# Patient Record
Sex: MALE | Race: WHITE | NOT HISPANIC OR LATINO
[De-identification: names, ages, dates, MRNs, and addresses within clinical notes are randomized per-mention and may not be internally consistent; named-entity substitution may affect disease eponyms.]

---

## 2020-10-08 ENCOUNTER — ERX REFILL RESPONSE (OUTPATIENT)
Age: 75
End: 2020-10-08

## 2020-10-08 RX ORDER — PANTOPRAZOLE SODIUM 20 MG/1
TAKE 1 TABLET (20 MG) BY ORAL ROUTE ONCE DAILY FOR 90 DAYS TABLET, DELAYED RELEASE ORAL
Qty: 30 | Refills: 0

## 2020-11-12 ENCOUNTER — TELEPHONE ENCOUNTER (OUTPATIENT)
Dept: URBAN - METROPOLITAN AREA CLINIC 115 | Facility: CLINIC | Age: 75
End: 2020-11-12

## 2020-11-12 ENCOUNTER — ERX REFILL RESPONSE (OUTPATIENT)
Age: 75
End: 2020-11-12

## 2020-11-12 RX ORDER — PANTOPRAZOLE SODIUM 20 MG/1
TAKE 1 TABLET (20 MG) BY ORAL ROUTE ONCE DAILY FOR 90 DAYS TABLET, DELAYED RELEASE ORAL
Qty: 30 | Refills: 0

## 2020-11-12 RX ORDER — PANTOPRAZOLE SODIUM 20 MG/1
TAKE 1 TABLET (20 MG) BY ORAL ROUTE ONCE DAILY FOR 90 DAYS TABLET, DELAYED RELEASE ORAL
Qty: 30 | Refills: 2

## 2020-12-03 ENCOUNTER — OFFICE VISIT (OUTPATIENT)
Dept: URBAN - METROPOLITAN AREA TELEHEALTH 2 | Facility: TELEHEALTH | Age: 75
End: 2020-12-03
Payer: COMMERCIAL

## 2020-12-03 DIAGNOSIS — R13.10 DYSPHAGIA: ICD-10-CM

## 2020-12-03 DIAGNOSIS — K57.90 DIVERTICULOSIS: ICD-10-CM

## 2020-12-03 DIAGNOSIS — K44.9 HIATAL HERNIA: ICD-10-CM

## 2020-12-03 DIAGNOSIS — R13.19 CERVICAL DYSPHAGIA: ICD-10-CM

## 2020-12-03 DIAGNOSIS — K22.2 SCHATZKI'S RING: ICD-10-CM

## 2020-12-03 DIAGNOSIS — K21.9 GERD: ICD-10-CM

## 2020-12-03 DIAGNOSIS — K57.30 ACQUIRED DIVERTICULOSIS OF COLON: ICD-10-CM

## 2020-12-03 PROBLEM — 84089009 HIATAL HERNIA: Status: ACTIVE | Noted: 2020-12-03

## 2020-12-03 PROBLEM — 235595009 GASTROESOPHAGEAL REFLUX DISEASE: Status: ACTIVE | Noted: 2020-12-03

## 2020-12-03 PROBLEM — 66889002 SCHATZKI'S RING: Status: ACTIVE | Noted: 2020-12-03

## 2020-12-03 PROCEDURE — G9903 PT SCRN TBCO ID AS NON USER: HCPCS | Performed by: INTERNAL MEDICINE

## 2020-12-03 PROCEDURE — G8484 FLU IMMUNIZE NO ADMIN: HCPCS | Performed by: INTERNAL MEDICINE

## 2020-12-03 PROCEDURE — 99213 OFFICE O/P EST LOW 20 MIN: CPT | Performed by: INTERNAL MEDICINE

## 2020-12-03 PROCEDURE — 1036F TOBACCO NON-USER: CPT | Performed by: INTERNAL MEDICINE

## 2020-12-03 PROCEDURE — G8422 PT INELIG BMI CALCULATION: HCPCS | Performed by: INTERNAL MEDICINE

## 2020-12-03 PROCEDURE — G8482 FLU IMMUNIZE ORDER/ADMIN: HCPCS | Performed by: INTERNAL MEDICINE

## 2020-12-03 PROCEDURE — G8427 DOCREV CUR MEDS BY ELIG CLIN: HCPCS | Performed by: INTERNAL MEDICINE

## 2020-12-03 RX ORDER — PANTOPRAZOLE SODIUM 20 MG/1
1 TABLET TABLET, DELAYED RELEASE ORAL
Qty: 45 | Refills: 3 | OUTPATIENT

## 2020-12-03 RX ORDER — SIMVASTATIN 20 MG/1
TABLET, FILM COATED ORAL
Qty: 0 | Refills: 0 | Status: ACTIVE | COMMUNITY
Start: 2016-03-31

## 2020-12-03 RX ORDER — PANTOPRAZOLE SODIUM 20 MG/1
TAKE 1 TABLET (20 MG) BY ORAL ROUTE ONCE DAILY FOR 90 DAYS TABLET, DELAYED RELEASE ORAL
Qty: 30 | Refills: 2 | Status: ACTIVE | COMMUNITY

## 2020-12-03 NOTE — HPI-TODAY'S VISIT:
DOING GOOD, STILL TAKING 20MG TAKES QOD. OCC HEART BURN, NO DYSPHAGIA, CHEW FOOD GOOD, CAN EAT EVERYTHING. BM GOOD. NO BLEEDING P/R.

## 2023-11-15 ENCOUNTER — TELEPHONE ENCOUNTER (OUTPATIENT)
Dept: URBAN - METROPOLITAN AREA CLINIC 82 | Facility: CLINIC | Age: 78
End: 2023-11-15

## 2023-11-15 ENCOUNTER — OFFICE VISIT (OUTPATIENT)
Dept: URBAN - METROPOLITAN AREA CLINIC 82 | Facility: CLINIC | Age: 78
End: 2023-11-15
Payer: COMMERCIAL

## 2023-11-15 VITALS
DIASTOLIC BLOOD PRESSURE: 87 MMHG | TEMPERATURE: 98 F | BODY MASS INDEX: 33.82 KG/M2 | HEIGHT: 65 IN | WEIGHT: 203 LBS | HEART RATE: 69 BPM | SYSTOLIC BLOOD PRESSURE: 139 MMHG

## 2023-11-15 DIAGNOSIS — K57.80 DIVERTICULAR DISEASE OF INTESTINE WITH PERFORATION AND ABSCESS: ICD-10-CM

## 2023-11-15 DIAGNOSIS — R13.19 CERVICAL DYSPHAGIA: ICD-10-CM

## 2023-11-15 DIAGNOSIS — K59.09 OTHER CONSTIPATION: ICD-10-CM

## 2023-11-15 DIAGNOSIS — K44.9 HIATAL HERNIA: ICD-10-CM

## 2023-11-15 PROBLEM — 40739000 DYSPHAGIA: Status: ACTIVE | Noted: 2020-12-03

## 2023-11-15 PROCEDURE — 99213 OFFICE O/P EST LOW 20 MIN: CPT | Performed by: INTERNAL MEDICINE

## 2023-11-15 PROCEDURE — 99243 OFF/OP CNSLTJ NEW/EST LOW 30: CPT | Performed by: INTERNAL MEDICINE

## 2023-11-15 RX ORDER — SIMVASTATIN 20 MG
TABLET ORAL
Qty: 0 | Refills: 0 | Status: ACTIVE | COMMUNITY
Start: 2016-03-31

## 2023-11-15 RX ORDER — PANTOPRAZOLE SODIUM 20 MG/1
TAKE 1 TABLET (20 MG) BY ORAL ROUTE ONCE DAILY FOR 90 DAYS TABLET, DELAYED RELEASE ORAL
Qty: 30 | Refills: 2 | COMMUNITY

## 2023-11-15 RX ORDER — PANTOPRAZOLE SODIUM 20 MG/1
1 TABLET TABLET, DELAYED RELEASE ORAL
Qty: 45 | Refills: 3 | COMMUNITY

## 2023-11-15 NOTE — HPI-TODAY'S VISIT:
SEPT, TOOK PROTEIN DRINK 30GM DAILY, BOTTEL, THEN GOT CONSTIPATION, IT WAS CONTRIBUTING, NOW 80.  PROBLEM WITH BM LITTLE BEANS OR ROPE ONCE SQUEEZED HARD, CAME OUT PRETZEL,  HARD WHITE COATING. DID IRRITATION WHITE MUCUSY MATERIAL CAME OUT.  BM SOFT, NOW.  MIRALAX, FROM PCP, HYDORCORTISONE, GYLCERINE SUPP,  CONSTIPATION UNDER CONTROL, IF STOOLL HARD COMES OUT LIKE LOG.  STOOL SOFT COMES OUT PENCIL  NO DYSPHGIA, STILL ON PANTOPRAZOLE, TWICE A DAY

## 2023-12-15 ENCOUNTER — OFFICE VISIT (OUTPATIENT)
Dept: URBAN - METROPOLITAN AREA CLINIC 82 | Facility: CLINIC | Age: 78
End: 2023-12-15

## 2024-01-03 ENCOUNTER — OFFICE VISIT (OUTPATIENT)
Dept: URBAN - METROPOLITAN AREA CLINIC 82 | Facility: CLINIC | Age: 79
End: 2024-01-03
Payer: COMMERCIAL

## 2024-01-03 ENCOUNTER — DASHBOARD ENCOUNTERS (OUTPATIENT)
Age: 79
End: 2024-01-03

## 2024-01-03 VITALS
HEART RATE: 78 BPM | HEIGHT: 65 IN | BODY MASS INDEX: 26.66 KG/M2 | DIASTOLIC BLOOD PRESSURE: 75 MMHG | TEMPERATURE: 97 F | WEIGHT: 160 LBS | SYSTOLIC BLOOD PRESSURE: 135 MMHG

## 2024-01-03 DIAGNOSIS — K22.2 SCHATZKI'S RING: ICD-10-CM

## 2024-01-03 DIAGNOSIS — K59.09 OTHER CONSTIPATION: ICD-10-CM

## 2024-01-03 DIAGNOSIS — K21.9 GERD: ICD-10-CM

## 2024-01-03 DIAGNOSIS — K44.9 HIATAL HERNIA: ICD-10-CM

## 2024-01-03 DIAGNOSIS — K57.90 DIVERTICULOSIS: ICD-10-CM

## 2024-01-03 DIAGNOSIS — R13.10 DYSPHAGIA: ICD-10-CM

## 2024-01-03 PROBLEM — 14760008: Status: ACTIVE | Noted: 2023-11-15

## 2024-01-03 PROCEDURE — 99212 OFFICE O/P EST SF 10 MIN: CPT | Performed by: INTERNAL MEDICINE

## 2024-01-03 RX ORDER — SIMVASTATIN 20 MG
TABLET ORAL
Qty: 0 | Refills: 0 | Status: ACTIVE | COMMUNITY
Start: 2016-03-31

## 2024-01-03 RX ORDER — PANTOPRAZOLE SODIUM 20 MG/1
TAKE 1 TABLET (20 MG) BY ORAL ROUTE ONCE DAILY FOR 90 DAYS TABLET, DELAYED RELEASE ORAL
Qty: 30 | Refills: 2 | Status: ACTIVE | COMMUNITY

## 2024-01-03 RX ORDER — PANTOPRAZOLE SODIUM 20 MG/1
1 TABLET TABLET, DELAYED RELEASE ORAL
Qty: 45 | Refills: 3 | Status: ON HOLD | COMMUNITY

## 2024-07-03 ENCOUNTER — OFFICE VISIT (OUTPATIENT)
Dept: URBAN - METROPOLITAN AREA CLINIC 82 | Facility: CLINIC | Age: 79
End: 2024-07-03
Payer: COMMERCIAL

## 2024-07-03 VITALS
HEART RATE: 86 BPM | WEIGHT: 154.8 LBS | TEMPERATURE: 98.2 F | DIASTOLIC BLOOD PRESSURE: 76 MMHG | BODY MASS INDEX: 25.79 KG/M2 | HEIGHT: 65 IN | SYSTOLIC BLOOD PRESSURE: 126 MMHG

## 2024-07-03 DIAGNOSIS — K59.01 SLOW TRANSIT CONSTIPATION: ICD-10-CM

## 2024-07-03 DIAGNOSIS — K21.9 GERD: ICD-10-CM

## 2024-07-03 PROBLEM — 35298007: Status: ACTIVE | Noted: 2024-07-03

## 2024-07-03 PROCEDURE — 99213 OFFICE O/P EST LOW 20 MIN: CPT | Performed by: STUDENT IN AN ORGANIZED HEALTH CARE EDUCATION/TRAINING PROGRAM

## 2024-07-03 RX ORDER — PANTOPRAZOLE SODIUM 20 MG/1
1 TABLET TABLET, DELAYED RELEASE ORAL
Qty: 45 | Refills: 3 | Status: DISCONTINUED | COMMUNITY

## 2024-07-03 RX ORDER — PANTOPRAZOLE SODIUM 20 MG/1
1 TABLET TABLET, DELAYED RELEASE ORAL ONCE A DAY
Qty: 30 TABLET | Refills: 2 | OUTPATIENT
Start: 2024-07-03

## 2024-07-03 RX ORDER — SIMVASTATIN 20 MG
1 TABLET IN THE EVENING TABLET ORAL ONCE A DAY
Refills: 0 | Status: ACTIVE | COMMUNITY
Start: 2016-03-31

## 2024-07-03 RX ORDER — PANTOPRAZOLE SODIUM 20 MG/1
TAKE 1 TABLET (20 MG) BY ORAL ROUTE ONCE DAILY FOR 90 DAYS TABLET, DELAYED RELEASE ORAL
Qty: 30 | Refills: 2 | Status: ACTIVE | COMMUNITY

## 2024-07-03 RX ORDER — LOSARTAN POTASSIUM AND HYDROCHLOROTHIAZIDE 50; 12.5 MG/1; MG/1
1 TABLET TABLET, FILM COATED ORAL ONCE A DAY
Status: ACTIVE | COMMUNITY

## 2024-07-03 NOTE — HPI-TODAY'S VISIT:
79 y.o male presents today for 6 months F/U for constipation. Patient states that he was dealing w/ constipation in the past. Was told to take citrucel, which did help. Currently eating brand creal currently, not taking citrucel anymore, doesnt need it. BM: daily, soft. No melena or hematochezia. He works out daily, light weight training for the past 3 months now. No abd pain, NV. Wt stable. Last colonoscopy 2016, grossly unremarkable. He is taking pantoprazole 20mg, twice a week only. Helps to keep his gerd sx under controlled.